# Patient Record
Sex: FEMALE | Race: WHITE | NOT HISPANIC OR LATINO | Employment: UNEMPLOYED | URBAN - METROPOLITAN AREA
[De-identification: names, ages, dates, MRNs, and addresses within clinical notes are randomized per-mention and may not be internally consistent; named-entity substitution may affect disease eponyms.]

---

## 2016-03-11 LAB — CRC RECOMMENDATION EXT: NORMAL

## 2020-12-30 LAB
BCS RECOMMENDATION EXT: NORMAL
BMD RECOMMENDATION EXT: NORMAL

## 2023-07-10 ENCOUNTER — HOSPITAL ENCOUNTER (EMERGENCY)
Facility: HOSPITAL | Age: 58
Discharge: HOME | End: 2023-07-10
Attending: EMERGENCY MEDICINE
Payer: MEDICARE

## 2023-07-10 VITALS
DIASTOLIC BLOOD PRESSURE: 76 MMHG | HEIGHT: 63 IN | SYSTOLIC BLOOD PRESSURE: 113 MMHG | TEMPERATURE: 98 F | OXYGEN SATURATION: 99 % | HEART RATE: 69 BPM | RESPIRATION RATE: 14 BRPM | BODY MASS INDEX: 31.54 KG/M2 | WEIGHT: 178 LBS

## 2023-07-10 DIAGNOSIS — I48.91 ATRIAL FIBRILLATION WITH RAPID VENTRICULAR RESPONSE: Primary | ICD-10-CM

## 2023-07-10 DIAGNOSIS — R06.02 SHORTNESS OF BREATH: ICD-10-CM

## 2023-07-10 PROBLEM — I48.19 PERSISTENT ATRIAL FIBRILLATION: Status: ACTIVE | Noted: 2023-07-10

## 2023-07-10 LAB
ALBUMIN SERPL BCP-MCNC: 3.9 G/DL (ref 3.5–5.2)
ALP SERPL-CCNC: 103 U/L (ref 55–135)
ALT SERPL W/O P-5'-P-CCNC: 42 U/L (ref 10–44)
ANION GAP SERPL CALC-SCNC: 9 MMOL/L (ref 8–16)
ASCENDING AORTA: 2.93 CM
AST SERPL-CCNC: 31 U/L (ref 10–40)
AV INDEX (PROSTH): 0.77
AV MEAN GRADIENT: 3 MMHG
AV PEAK GRADIENT: 5 MMHG
AV VALVE AREA: 2.41 CM2
AV VELOCITY RATIO: 0.76
BASOPHILS # BLD AUTO: 0.05 K/UL (ref 0–0.2)
BASOPHILS NFR BLD: 1.1 % (ref 0–1.9)
BILIRUB SERPL-MCNC: 0.8 MG/DL (ref 0.1–1)
BNP SERPL-MCNC: 50 PG/ML (ref 0–99)
BSA FOR ECHO PROCEDURE: 1.89 M2
BUN SERPL-MCNC: 16 MG/DL (ref 6–20)
CALCIUM SERPL-MCNC: 8.8 MG/DL (ref 8.7–10.5)
CHLORIDE SERPL-SCNC: 109 MMOL/L (ref 95–110)
CO2 SERPL-SCNC: 22 MMOL/L (ref 23–29)
CREAT SERPL-MCNC: 0.7 MG/DL (ref 0.5–1.4)
CTP QC/QA: YES
CV ECHO LV RWT: 0.34 CM
DIFFERENTIAL METHOD: ABNORMAL
DOP CALC AO PEAK VEL: 1.14 M/S
DOP CALC AO VTI: 20.4 CM
DOP CALC LVOT AREA: 3.1 CM2
DOP CALC LVOT DIAMETER: 1.99 CM
DOP CALC LVOT PEAK VEL: 0.87 M/S
DOP CALC LVOT STROKE VOLUME: 49.12 CM3
DOP CALC MV VTI: 8.9 CM
DOP CALCLVOT PEAK VEL VTI: 15.8 CM
E WAVE DECELERATION TIME: 88.35 MSEC
E/E' RATIO: 5.43 M/S
ECHO LV POSTERIOR WALL: 0.73 CM (ref 0.6–1.1)
EJECTION FRACTION: 60 %
EOSINOPHIL # BLD AUTO: 0.2 K/UL (ref 0–0.5)
EOSINOPHIL NFR BLD: 3.2 % (ref 0–8)
ERYTHROCYTE [DISTWIDTH] IN BLOOD BY AUTOMATED COUNT: 12.1 % (ref 11.5–14.5)
EST. GFR  (NO RACE VARIABLE): >60 ML/MIN/1.73 M^2
FRACTIONAL SHORTENING: 31 % (ref 28–44)
GLUCOSE SERPL-MCNC: 99 MG/DL (ref 70–110)
HCT VFR BLD AUTO: 37.8 % (ref 37–48.5)
HGB BLD-MCNC: 12.6 G/DL (ref 12–16)
IMM GRANULOCYTES # BLD AUTO: 0 K/UL (ref 0–0.04)
IMM GRANULOCYTES NFR BLD AUTO: 0 % (ref 0–0.5)
INTERVENTRICULAR SEPTUM: 0.69 CM (ref 0.6–1.1)
IVC DIAMETER: 0.68 CM
LA MAJOR: 5.5 CM
LA MINOR: 3.46 CM
LA WIDTH: 3.5 CM
LEFT ATRIUM SIZE: 3.32 CM
LEFT ATRIUM VOLUME INDEX: 22.8 ML/M2
LEFT ATRIUM VOLUME: 41.96 CM3
LEFT INTERNAL DIMENSION IN SYSTOLE: 2.93 CM (ref 2.1–4)
LEFT VENTRICLE DIASTOLIC VOLUME INDEX: 43.82 ML/M2
LEFT VENTRICLE DIASTOLIC VOLUME: 80.62 ML
LEFT VENTRICLE MASS INDEX: 48 G/M2
LEFT VENTRICLE SYSTOLIC VOLUME INDEX: 17.9 ML/M2
LEFT VENTRICLE SYSTOLIC VOLUME: 32.94 ML
LEFT VENTRICULAR INTERNAL DIMENSION IN DIASTOLE: 4.25 CM (ref 3.5–6)
LEFT VENTRICULAR MASS: 88.39 G
LV LATERAL E/E' RATIO: 5 M/S
LV SEPTAL E/E' RATIO: 5.94 M/S
LVOT MG: 2.13 MMHG
LVOT MV: 0.72 CM/S
LYMPHOCYTES # BLD AUTO: 2.4 K/UL (ref 1–4.8)
LYMPHOCYTES NFR BLD: 50.5 % (ref 18–48)
MAGNESIUM SERPL-MCNC: 2.1 MG/DL (ref 1.6–2.6)
MCH RBC QN AUTO: 30.2 PG (ref 27–31)
MCHC RBC AUTO-ENTMCNC: 33.3 G/DL (ref 32–36)
MCV RBC AUTO: 91 FL (ref 82–98)
MONOCYTES # BLD AUTO: 0.4 K/UL (ref 0.3–1)
MONOCYTES NFR BLD: 9.2 % (ref 4–15)
MV MEAN GRADIENT: 1 MMHG
MV PEAK E VEL: 0.95 M/S
MV PEAK GRADIENT: 3 MMHG
MV STENOSIS PRESSURE HALF TIME: 36.81 MS
MV VALVE AREA BY CONTINUITY EQUATION: 5.52 CM2
MV VALVE AREA P 1/2 METHOD: 5.98 CM2
NEUTROPHILS # BLD AUTO: 1.7 K/UL (ref 1.8–7.7)
NEUTROPHILS NFR BLD: 36 % (ref 38–73)
NRBC BLD-RTO: 0 /100 WBC
PISA TR MAX VEL: 1.71 M/S
PLATELET # BLD AUTO: 170 K/UL (ref 150–450)
PMV BLD AUTO: 10.8 FL (ref 9.2–12.9)
POTASSIUM SERPL-SCNC: 4.1 MMOL/L (ref 3.5–5.1)
PROT SERPL-MCNC: 7.1 G/DL (ref 6–8.4)
PV PEAK VELOCITY: 0.65 CM/S
RA MAJOR: 4.65 CM
RA PRESSURE: 3 MMHG
RA WIDTH: 2.6 CM
RBC # BLD AUTO: 4.17 M/UL (ref 4–5.4)
RIGHT VENTRICULAR END-DIASTOLIC DIMENSION: 2.09 CM
SARS-COV-2 RDRP RESP QL NAA+PROBE: NEGATIVE
SINUS: 2.54 CM
SODIUM SERPL-SCNC: 140 MMOL/L (ref 136–145)
STJ: 2.2 CM
TDI LATERAL: 0.19 M/S
TDI SEPTAL: 0.16 M/S
TDI: 0.18 M/S
TR MAX PG: 12 MMHG
TRICUSPID ANNULAR PLANE SYSTOLIC EXCURSION: 2.19 CM
TROPONIN I SERPL DL<=0.01 NG/ML-MCNC: 0.01 NG/ML (ref 0–0.03)
TSH SERPL DL<=0.005 MIU/L-ACNC: 3.31 UIU/ML (ref 0.4–4)
TV REST PULMONARY ARTERY PRESSURE: 15 MMHG
WBC # BLD AUTO: 4.65 K/UL (ref 3.9–12.7)

## 2023-07-10 PROCEDURE — 96374 THER/PROPH/DIAG INJ IV PUSH: CPT

## 2023-07-10 PROCEDURE — 99291 CRITICAL CARE FIRST HOUR: CPT

## 2023-07-10 PROCEDURE — 85025 COMPLETE CBC W/AUTO DIFF WBC: CPT | Performed by: EMERGENCY MEDICINE

## 2023-07-10 PROCEDURE — 93010 EKG 12-LEAD: ICD-10-PCS | Mod: ,,, | Performed by: INTERNAL MEDICINE

## 2023-07-10 PROCEDURE — 63600175 PHARM REV CODE 636 W HCPCS: Performed by: EMERGENCY MEDICINE

## 2023-07-10 PROCEDURE — 87635 SARS-COV-2 COVID-19 AMP PRB: CPT | Performed by: EMERGENCY MEDICINE

## 2023-07-10 PROCEDURE — 25000003 PHARM REV CODE 250: Performed by: INTERNAL MEDICINE

## 2023-07-10 PROCEDURE — 99284 EMERGENCY DEPT VISIT MOD MDM: CPT | Mod: 25,,, | Performed by: INTERNAL MEDICINE

## 2023-07-10 PROCEDURE — 96372 THER/PROPH/DIAG INJ SC/IM: CPT | Mod: 59 | Performed by: EMERGENCY MEDICINE

## 2023-07-10 PROCEDURE — 99152 MOD SED SAME PHYS/QHP 5/>YRS: CPT

## 2023-07-10 PROCEDURE — 84484 ASSAY OF TROPONIN QUANT: CPT | Performed by: EMERGENCY MEDICINE

## 2023-07-10 PROCEDURE — 96375 TX/PRO/DX INJ NEW DRUG ADDON: CPT

## 2023-07-10 PROCEDURE — 92960 CARDIOVERSION ELECTRIC EXT: CPT | Mod: ,,, | Performed by: INTERNAL MEDICINE

## 2023-07-10 PROCEDURE — 83880 ASSAY OF NATRIURETIC PEPTIDE: CPT | Performed by: EMERGENCY MEDICINE

## 2023-07-10 PROCEDURE — 99284 PR EMERGENCY DEPT VISIT,LEVEL IV: ICD-10-PCS | Mod: 25,,, | Performed by: INTERNAL MEDICINE

## 2023-07-10 PROCEDURE — 92960 CARDIOVERSION ELECTRIC EXT: CPT | Performed by: INTERNAL MEDICINE

## 2023-07-10 PROCEDURE — 92960 PR CARDIOVERSION, ELECTIVE;EXTERN: ICD-10-PCS | Mod: ,,, | Performed by: INTERNAL MEDICINE

## 2023-07-10 PROCEDURE — 25000003 PHARM REV CODE 250: Performed by: EMERGENCY MEDICINE

## 2023-07-10 PROCEDURE — 83735 ASSAY OF MAGNESIUM: CPT | Performed by: EMERGENCY MEDICINE

## 2023-07-10 PROCEDURE — 80053 COMPREHEN METABOLIC PANEL: CPT | Performed by: EMERGENCY MEDICINE

## 2023-07-10 PROCEDURE — 93010 ELECTROCARDIOGRAM REPORT: CPT | Mod: ,,, | Performed by: INTERNAL MEDICINE

## 2023-07-10 PROCEDURE — 84443 ASSAY THYROID STIM HORMONE: CPT | Performed by: EMERGENCY MEDICINE

## 2023-07-10 PROCEDURE — 93005 ELECTROCARDIOGRAM TRACING: CPT | Mod: 59

## 2023-07-10 RX ORDER — PROPOFOL 10 MG/ML
80 VIAL (ML) INTRAVENOUS ONCE
Status: COMPLETED | OUTPATIENT
Start: 2023-07-10 | End: 2023-07-10

## 2023-07-10 RX ORDER — ONDANSETRON 2 MG/ML
4 INJECTION INTRAMUSCULAR; INTRAVENOUS
Status: COMPLETED | OUTPATIENT
Start: 2023-07-10 | End: 2023-07-10

## 2023-07-10 RX ORDER — PROPOFOL 10 MG/ML
VIAL (ML) INTRAVENOUS
Status: DISCONTINUED | OUTPATIENT
Start: 2023-07-10 | End: 2023-07-10 | Stop reason: HOSPADM

## 2023-07-10 RX ORDER — DILTIAZEM HYDROCHLORIDE 5 MG/ML
20 INJECTION INTRAVENOUS
Status: DISCONTINUED | OUTPATIENT
Start: 2023-07-10 | End: 2023-07-10 | Stop reason: HOSPADM

## 2023-07-10 RX ORDER — ENOXAPARIN SODIUM 100 MG/ML
1 INJECTION SUBCUTANEOUS
Status: COMPLETED | OUTPATIENT
Start: 2023-07-10 | End: 2023-07-10

## 2023-07-10 RX ORDER — METOPROLOL SUCCINATE 50 MG/1
50 TABLET, EXTENDED RELEASE ORAL DAILY
Status: DISCONTINUED | OUTPATIENT
Start: 2023-07-10 | End: 2023-07-10 | Stop reason: HOSPADM

## 2023-07-10 RX ORDER — METOPROLOL SUCCINATE 50 MG/1
50 TABLET, EXTENDED RELEASE ORAL DAILY
Qty: 90 TABLET | Refills: 3 | Status: SHIPPED | OUTPATIENT
Start: 2023-07-11

## 2023-07-10 RX ORDER — DILTIAZEM HYDROCHLORIDE 5 MG/ML
0.25 INJECTION INTRAVENOUS
Status: COMPLETED | OUTPATIENT
Start: 2023-07-10 | End: 2023-07-10

## 2023-07-10 RX ADMIN — ENOXAPARIN SODIUM 80 MG: 80 INJECTION SUBCUTANEOUS at 07:07

## 2023-07-10 RX ADMIN — METOPROLOL SUCCINATE 50 MG: 50 TABLET, EXTENDED RELEASE ORAL at 08:07

## 2023-07-10 RX ADMIN — PROPOFOL 80 MG: 10 INJECTION, EMULSION INTRAVENOUS at 09:07

## 2023-07-10 RX ADMIN — ONDANSETRON 4 MG: 2 INJECTION INTRAMUSCULAR; INTRAVENOUS at 09:07

## 2023-07-10 RX ADMIN — DILTIAZEM HYDROCHLORIDE 20 MG: 5 INJECTION INTRAVENOUS at 06:07

## 2023-07-10 NOTE — ED NOTES
Bed: 04main  Expected date:   Expected time:   Means of arrival:   Comments:  BEDSIDE CARDIOVERSION

## 2023-07-10 NOTE — PROCEDURES
"Shamika Padilla is a 57 y.o. female patient.    Temp: 97.8 °F (36.6 °C) (07/10/23 0539)  Pulse: (!) 141 (07/10/23 0844)  Resp: (!) 21 (07/10/23 0842)  BP: 121/77 (07/10/23 0844)  SpO2: 97 % (07/10/23 0842)  Weight: 80.7 kg (178 lb) (07/10/23 0539)  Height: 5' 3" (160 cm) (07/10/23 0539)       Procedures    CV   Dr Bowen  Pre-op Dx A-fib  Post-op Dx same  Specimen none  EBL < 50 cc    7/10/23 CV - Sedation by ER physician. 200J shock converted A-fib to NSR 80s    Ok for d/c  Xarelto for 1 month then ASA 81 qd  Tropol XL 50 qd  OV with me 1 week with EKG    7/10/2023    "

## 2023-07-10 NOTE — HPI
58 yo F presenting with acute onset of heart racing palpitations and neck tightness 40 min PTA. States this woke her up from her sleep. Also reports associated chest discomfort and tightness on ambulation. No other exacerbating or alleviating factors. Denies recent dyspnea, PND, SANFORD, abdominal pain, nausea, chest pain, other associated symptoms. Nonsmoker. No alcohol or illicit drug use. No personal h/o PE, DVT, surgeries in the last 4 mo or other recent immobilizations. No PMHx, but has not f/u w/ a PCP in years. No personal h/o Afib.     Initially rates improved with IV diltiazem - going back up now  Denies CP or SOB  On no medications  EKG A-fib 164 NSSTT changes  BNP 50  Troponin negative

## 2023-07-10 NOTE — ED NOTES
Received report from YUNG Kenyon. Pt MOSES maldonado. Pt currently still in Afib (MD AWARE- cardiology consulted.) Ordered medications administered. Awaiting echo. Pt NPO for time being. Denies any needs at this time.

## 2023-07-10 NOTE — ASSESSMENT & PLAN NOTE
New Dx. Woke her from sleep. Rates improved with IV diltiazem but going back up. Check echo and TSH. Full dose lovenox. PO BB. Discussed CV in ER and patient is in agreement. No Need for MECHE with < 48 duration. CHADS-vasc 1 - xarelto for 1 month at discharge then ASA

## 2023-07-10 NOTE — ED NOTES
Pt presents to ED c/o chest tightness that radiates to her neck. Pt states pain woke her up. Afib with RVR noted on monitor. Pt denies medical hx. Denies SOB, weakness or dizziness. Pt placed on telemetry. Ed work up in progress.

## 2023-07-10 NOTE — ED PROVIDER NOTES
Encounter Date: 7/10/2023    SCRIBE #1 NOTE: I, Catherine Chan, am scribing for, and in the presence of,  Philipp Bell MD.     History     Chief Complaint   Patient presents with    Palpitations    Chest Pain     Arrives to ER with complaints of CP that woke her up out of her sleep, pain started in her neck and radiated down to the middle of her chest. Denies any heart hx.      58 yo F presenting with acute onset of heart racing palpitations and neck tightness 40 min PTA. States this woke her up from her sleep. Also reports associated chest discomfort and tightness on ambulation. No other exacerbating or alleviating factors. Denies recent dyspnea, PND, SANFORD, abdominal pain, nausea, chest pain, other associated symptoms. Nonsmoker. No alcohol or illicit drug use. No personal h/o PE, DVT, surgeries in the last 4 mo or other recent immobilizations. No PMHx, but has not f/u w/ a PCP in years. No personal h/o Afib.     The history is provided by the patient.   Review of patient's allergies indicates:  No Known Allergies  No past medical history on file.  No past surgical history on file.  No family history on file.     Review of Systems   Constitutional:  Negative for chills and fever.   HENT:  Negative for congestion, rhinorrhea and sore throat.    Eyes:  Negative for visual disturbance.   Respiratory:  Positive for chest tightness. Negative for cough and shortness of breath.    Cardiovascular:  Positive for palpitations. Negative for chest pain and leg swelling.   Gastrointestinal:  Negative for abdominal pain, diarrhea, nausea and vomiting.   Genitourinary:  Negative for dysuria, frequency and hematuria.   Musculoskeletal:  Negative for back pain.        +neck tightness   Skin:  Negative for rash.   Neurological:  Negative for dizziness, weakness and headaches.     Physical Exam     Initial Vitals [07/10/23 0539]   BP Pulse Resp Temp SpO2   (!) 123/92 (!) 116 20 97.8 °F (36.6 °C) 98 %      MAP       --      "    Physical Exam    Nursing note and vitals reviewed.  Constitutional: She appears well-developed and well-nourished. She does not appear ill. No distress.   HENT:   Head: Normocephalic and atraumatic.   Mouth/Throat: Oropharynx is clear and moist.   Eyes: Conjunctivae and EOM are normal.   Neck:   Normal range of motion.  Cardiovascular:  Normal heart sounds.           No murmur heard.  Irregular. Rapid HR   Pulmonary/Chest: Breath sounds normal. No respiratory distress. She has no wheezes.   Abdominal: Abdomen is soft. There is no abdominal tenderness.   Musculoskeletal:         General: No edema.      Cervical back: Normal range of motion.      Comments: No BLE edema or tenderness.      Neurological: She is alert. GCS score is 15.   Skin: Skin is warm.   Psychiatric: She has a normal mood and affect.       ED Course   Procedural Sedation        Date/Time: 7/10/2023 6:00 AM  Performed by: Kenyon Dolan MD  Authorized by: Kenyon Dolan MD   Consent Done: Yes  Consent: Verbal consent obtained. Written consent obtained.  Risks and benefits: risks, benefits and alternatives were discussed  Consent given by: patient  Patient understanding: patient states understanding of the procedure being performed  Patient consent: the patient's understanding of the procedure matches consent given  Procedure consent: procedure consent matches procedure scheduled  Relevant documents: relevant documents present and verified  Test results: test results available and properly labeled  Imaging studies: imaging studies available  Required items: required blood products, implants, devices, and special equipment available  Time out: Immediately prior to procedure a "time out" was called to verify the correct patient, procedure, equipment, support staff and site/side marked as required.  ASA Class: Class 1 - Heathy patient. No medical history.  Mallampati Score: Class 1 - Visualization of the soft palate, fauces, uvula, and " anterior/posterior pillars.   NPO STATUS:  Date/Time of last solid: 7/9/2023 8:00 PM  Contents of last solid: no food, water only  Date/Time of last fluid: 7/10/2023 5:00 AM  Contents of last fluid: water    Equipment: on cardiac monitor., on BP monitor., on CO2 monitor., airway equipment available., suction available., on supplemental oxygen. and reversal drugs available.     Sedation type: deep sedation    Sedatives: propofol  Sedation start date/time: 7/10/2023 9:02 AM  Sedation end date/time: 7/10/2023 9:07 AM  Total Sedation Time (min): 5  Vitals: Vital signs were monitored during sedation.  Complications: No complications.   Patient/Family history of anesthesia or sedation complications: No    Critical Care    Date/Time: 7/10/2023 6:00 AM  Performed by: Kenyon Dolan MD  Authorized by: Kenyon Dolan MD   Direct patient critical care time: 15 minutes  Additional history critical care time: 5 minutes  Ordering / reviewing critical care time: 5 minutes  Documentation critical care time: 5 minutes  Consulting other physicians critical care time: 5 minutes  Total critical care time (exclusive of procedural time) : 35 minutes  Critical care time was exclusive of separately billable procedures and treating other patients and teaching time.  Critical care was necessary to treat or prevent imminent or life-threatening deterioration of the following conditions: cardiac failure, circulatory failure and shock.  Critical care was time spent personally by me on the following activities: development of treatment plan with patient or surrogate, discussions with consultants, evaluation of patient's response to treatment, examination of patient, obtaining history from patient or surrogate, ordering and performing treatments and interventions, ordering and review of laboratory studies, ordering and review of radiographic studies, re-evaluation of patient's condition and review of old charts.      Labs Reviewed  "  CBC W/ AUTO DIFFERENTIAL - Abnormal; Notable for the following components:       Result Value    Gran # (ANC) 1.7 (*)     Gran % 36.0 (*)     Lymph % 50.5 (*)     All other components within normal limits   COMPREHENSIVE METABOLIC PANEL - Abnormal; Notable for the following components:    CO2 22 (*)     All other components within normal limits   SARS-COV-2 RDRP GENE - Normal   B-TYPE NATRIURETIC PEPTIDE   TROPONIN I   MAGNESIUM   MAGNESIUM   TSH        ECG Results              EKG 12-lead (Final result)  Result time 07/10/23 19:51:31      Final result by Interface, Lab In [facility] (07/10/23 19:51:31)                   Narrative:    Test Reason : R06.02,    Vent. Rate : 102 BPM     Atrial Rate : 133 BPM     P-R Int : 000 ms          QRS Dur : 074 ms      QT Int : 350 ms       P-R-T Axes : 000 056 010 degrees     QTc Int : 456 ms    Atrial fibrillation with rapid ventricular response  Abnormal ECG  When compared with ECG of 10-JUL-2023 05:36,  Significant changes have occurred  Confirmed by Jose Luis Bowen MD ([id number]) on 7/10/2023 7:51:23 PM    Referred By: [email or web address]   SELF           Confirmed By:Jose Luis Bowen MD                                  Imaging Results              X-Ray Chest 1 View (Final result)  Result time 07/10/23 06:55:28      Final result by Travon Joseph MD (07/10/23 06:55:28)                   Impression:      Subtle increased ground-glass attenuation in the left lung base which could be exaggerated by breast attenuation.  Underlying infectious/inflammatory process felt less likely in the absence of clinical symptoms.    Otherwise, no acute abnormality identified.      Electronically signed by: Travon Joseph MD  Date:    07/10/2023  Time:    06:55               Narrative:    EXAMINATION:  XR CHEST 1 VIEW    CLINICAL HISTORY:  Provided history is "shortness of breath;  ".    TECHNIQUE:  One view of the chest.    COMPARISON:  None.    FINDINGS:  Cardiac wires overlie the chest.  Cardiomediastinal " silhouette is magnified by portable technique and may be at the upper limits of normal in size.  Subtle increased ground-glass attenuation in the left lung base which could be exaggerated by breast attenuation.  No confluent area of consolidation.  No large pleural effusion.  No pneumothorax.                                       Medications   diltiaZEM injection 20 mg (20 mg Intravenous Given 7/10/23 0613)   enoxaparin injection 80 mg (80 mg Subcutaneous Given 7/10/23 0714)   propofol (DIPRIVAN) 10 mg/mL IVP (80 mg Intravenous Given by Other 7/10/23 0902)   ondansetron injection 4 mg (4 mg Intravenous Given 7/10/23 0901)     Medical Decision Making:   History:   Old Medical Records: I decided to obtain old medical records.        Scribe Attestation:   Scribe #1: I performed the above scribed service and the documentation accurately describes the services I performed. I attest to the accuracy of the note.      ED Course as of 07/10/23 2240   Mon Jul 10, 2023   0543 EKG 12-lead  Time 5:36 a.m.     Rate roughly 160, not sinus, irregular rhythm, normal axis.  QRS 82 .  No ST elevation or depression.  T-wave inversion lead 3.  No hyperacute T-waves.  Infrequent PVCs.      Atrial fibrillation with rapid ventricular rate, infrequent PVCs.   [JM]      ED Course User Index  [JM] Philipp Bell MD         Assumed care patient after initial evaluation, pending additional lab work.  Additional lab work appears unremarkable, EKG shows AFib with RVR.  Initial diltiazem bolus significantly improving symptoms, blood pressure remained stable.  Cardiology evaluated patient at bedside, recommending Lovenox, metoprolol, cardioversion.  Patient consented for sedation as noted above, cardiology bedside to assist with procedural sedation for cardioversion of atrial fibrillation.  Patient successfully cardioverted to normal sinus rhythm.  She will be discharged on oral metoprolol and Xarelto and have follow-up in clinic.   Echocardiogram appears unremarkable.  Patient was discharged to home improved and stable.  All questions were answered and patient in understanding of follow-up plan.            I, Kenyon Dolan M.D., personally performed the services described in this documentation. All medical record entries made by the scribe were at my direction and in my presence. I have reviewed the chart and agree that the record reflects my personal performance and is accurate and complete.    Clinical Impression:   Final diagnoses:  [R06.02] Shortness of breath  [I48.91] Atrial fibrillation with rapid ventricular response (Primary)        ED Disposition Condition    Discharge Stable          ED Prescriptions       Medication Sig Dispense Start Date End Date Auth. Provider    rivaroxaban (XARELTO) 20 mg Tab Take 1 tablet (20 mg total) by mouth daily with dinner or evening meal. 30 tablet 7/10/2023 -- Jose Luis Bowen MD    metoprolol succinate (TOPROL-XL) 50 MG 24 hr tablet Take 1 tablet (50 mg total) by mouth once daily. 90 tablet 7/11/2023 -- Jose Luis Bowen MD          Follow-up Information       Follow up With Specialties Details Why Contact Info    [facility] Emergency Dept Emergency Medicine Go to  If symptoms worsen [address]  [facility] [id number] [phone]    Jose Luis Bowen MD Cardiology Schedule an appointment as soon as possible for a visit   [address]  [address]  [facility] [address]  [phone]               Kenyon Dolan MD  07/10/23 6595

## 2023-07-10 NOTE — CONSULTS
[facility] Emergency Dept  Cardiology  Consult Note    Patient Name: Shamika Padilla  MRN: [id number]  Admission Date: 7/10/2023  Hospital Length of Stay: 0 days  Code Status: Full Code   Attending Provider: Kenyon Dolan MD   Consulting Provider: Jose Luis Bowen MD  Primary Care Physician: No primary care provider on file.  Principal Problem:<principal problem not specified>    Patient information was obtained from patient and ER records.     Inpatient consult to Cardiology  Consult performed by: Jose Luis Bowen MD  Consult ordered by: Kenyon Dolan MD        Subjective:     Chief Complaint:  A-fib     HPI:   58 yo F presenting with acute onset of heart racing palpitations and neck tightness 40 min PTA. States this woke her up from her sleep. Also reports associated chest discomfort and tightness on ambulation. No other exacerbating or alleviating factors. Denies recent dyspnea, PND, SANFORD, abdominal pain, nausea, chest pain, other associated symptoms. Nonsmoker. No alcohol or illicit drug use. No personal h/o PE, DVT, surgeries in the last 4 mo or other recent immobilizations. No PMHx, but has not f/u w/ a PCP in years. No personal h/o Afib.     Initially rates improved with IV diltiazem - going back up now  Denies CP or SOB  On no medications  EKG A-fib 164 NSSTT changes  BNP 50  Troponin negative      No past medical history on file.    No past surgical history on file.    Review of patient's allergies indicates:  No Known Allergies    No current facility-administered medications on file prior to encounter.     No current outpatient medications on file prior to encounter.     Family History    None       Tobacco Use    Smoking status: Not on file    Smokeless tobacco: Not on file   Substance and Sexual Activity    Alcohol use: Not on file    Drug use: Not on file    Sexual activity: Not on file     Review of Systems   Constitutional: Negative for decreased appetite.   HENT:  Negative for ear  discharge.    Eyes:  Negative for blurred vision.   Respiratory:  Negative for hemoptysis.    Endocrine: Negative for polyphagia.   Hematologic/Lymphatic: Negative for adenopathy.   Skin:  Negative for color change.   Musculoskeletal:  Negative for joint swelling.   Genitourinary:  Negative for bladder incontinence.   Neurological:  Negative for brief paralysis.   Psychiatric/Behavioral:  Negative for hallucinations.    Allergic/Immunologic: Negative for hives.   Objective:     Vital Signs (Most Recent):  Temp: 97.8 °F (36.6 °C) (07/10/23 0539)  Pulse: 105 (07/10/23 0842)  Resp: (!) 21 (07/10/23 0842)  BP: 119/82 (07/10/23 0842)  SpO2: 97 % (07/10/23 0842) Vital Signs (24h Range):  Temp:  [97.8 °F (36.6 °C)] 97.8 °F (36.6 °C)  Pulse:  [] 105  Resp:  [10-21] 21  SpO2:  [95 %-98 %] 97 %  BP: (110-133)/(56-92) 119/82     Weight: 80.7 kg (178 lb)  Body mass index is 31.53 kg/m².    SpO2: 97 %       No intake or output data in the 24 hours ending 07/10/23 0845    Lines/Drains/Airways       Peripheral Intravenous Line  Duration                  Peripheral IV - Single Lumen 07/10/23 0607 20 G;1 in Anterior;Proximal;Right Forearm <1 day                     Physical Exam  Constitutional:       Appearance: She is well-developed.   HENT:      Head: Normocephalic and atraumatic.   Eyes:      Conjunctiva/sclera: Conjunctivae normal.      Pupils: Pupils are equal, round, and reactive to light.   Cardiovascular:      Rate and Rhythm: Tachycardia present. Rhythm irregular.      Pulses: Intact distal pulses.      Heart sounds: Normal heart sounds.   Pulmonary:      Effort: Pulmonary effort is normal.      Breath sounds: Normal breath sounds.   Abdominal:      General: Bowel sounds are normal.      Palpations: Abdomen is soft.   Musculoskeletal:         General: Normal range of motion.      Cervical back: Normal range of motion and neck supple.   Skin:     General: Skin is warm and dry.   Neurological:      Mental Status: She  is alert and oriented to person, place, and time.        Significant Labs: All pertinent lab results from the last 24 hours have been reviewed.    Significant Imaging: Echocardiogram: 2D echo with color flow doppler: No results found for this or any previous visit.    Assessment and Plan:     Persistent atrial fibrillation  New Dx. Woke her from sleep. Rates improved with IV diltiazem but going back up. Check echo and TSH. Full dose lovenox. PO BB. Discussed CV in ER and patient is in agreement. No Need for MECHE with < 48 duration. CHADS-vasc 1 - xarelto for 1 month at discharge then ASA        VTE Risk Mitigation (From admission, onward)    None          Thank you for your consult. I will follow-up with patient. Please contact us if you have any additional questions.    Jose Luis Bowen MD  Cardiology   [facility] [place] [place] - Emergency Dept

## 2023-07-14 ENCOUNTER — OFFICE VISIT (OUTPATIENT)
Dept: CARDIOLOGY | Facility: CLINIC | Age: 58
End: 2023-07-14
Payer: MEDICARE

## 2023-07-14 VITALS
RESPIRATION RATE: 15 BRPM | OXYGEN SATURATION: 98 % | HEIGHT: 63 IN | BODY MASS INDEX: 32.79 KG/M2 | HEART RATE: 84 BPM | DIASTOLIC BLOOD PRESSURE: 86 MMHG | SYSTOLIC BLOOD PRESSURE: 114 MMHG | WEIGHT: 185.06 LBS

## 2023-07-14 DIAGNOSIS — I48.19 PERSISTENT ATRIAL FIBRILLATION: Primary | ICD-10-CM

## 2023-07-14 DIAGNOSIS — I48.91 ATRIAL FIBRILLATION WITH RAPID VENTRICULAR RESPONSE: ICD-10-CM

## 2023-07-14 PROCEDURE — 3074F PR MOST RECENT SYSTOLIC BLOOD PRESSURE < 130 MM HG: ICD-10-PCS | Mod: CPTII,S$GLB,, | Performed by: INTERNAL MEDICINE

## 2023-07-14 PROCEDURE — 99999 PR PBB SHADOW E&M-EST. PATIENT-LVL III: ICD-10-PCS | Mod: PBBFAC,,, | Performed by: INTERNAL MEDICINE

## 2023-07-14 PROCEDURE — 1159F MED LIST DOCD IN RCRD: CPT | Mod: CPTII,S$GLB,, | Performed by: INTERNAL MEDICINE

## 2023-07-14 PROCEDURE — 99999 PR PBB SHADOW E&M-EST. PATIENT-LVL III: CPT | Mod: PBBFAC,,, | Performed by: INTERNAL MEDICINE

## 2023-07-14 PROCEDURE — 3074F SYST BP LT 130 MM HG: CPT | Mod: CPTII,S$GLB,, | Performed by: INTERNAL MEDICINE

## 2023-07-14 PROCEDURE — 3079F DIAST BP 80-89 MM HG: CPT | Mod: CPTII,S$GLB,, | Performed by: INTERNAL MEDICINE

## 2023-07-14 PROCEDURE — 3008F PR BODY MASS INDEX (BMI) DOCUMENTED: ICD-10-PCS | Mod: CPTII,S$GLB,, | Performed by: INTERNAL MEDICINE

## 2023-07-14 PROCEDURE — 1159F PR MEDICATION LIST DOCUMENTED IN MEDICAL RECORD: ICD-10-PCS | Mod: CPTII,S$GLB,, | Performed by: INTERNAL MEDICINE

## 2023-07-14 PROCEDURE — 3079F PR MOST RECENT DIASTOLIC BLOOD PRESSURE 80-89 MM HG: ICD-10-PCS | Mod: CPTII,S$GLB,, | Performed by: INTERNAL MEDICINE

## 2023-07-14 PROCEDURE — 93000 ELECTROCARDIOGRAM COMPLETE: CPT | Mod: S$GLB,,, | Performed by: INTERNAL MEDICINE

## 2023-07-14 PROCEDURE — 99214 PR OFFICE/OUTPT VISIT, EST, LEVL IV, 30-39 MIN: ICD-10-PCS | Mod: S$GLB,,, | Performed by: INTERNAL MEDICINE

## 2023-07-14 PROCEDURE — 3008F BODY MASS INDEX DOCD: CPT | Mod: CPTII,S$GLB,, | Performed by: INTERNAL MEDICINE

## 2023-07-14 PROCEDURE — 99214 OFFICE O/P EST MOD 30 MIN: CPT | Mod: S$GLB,,, | Performed by: INTERNAL MEDICINE

## 2023-07-14 PROCEDURE — 93000 EKG 12-LEAD: ICD-10-PCS | Mod: S$GLB,,, | Performed by: INTERNAL MEDICINE

## 2023-07-14 RX ORDER — ASPIRIN 81 MG/1
81 TABLET ORAL DAILY
Refills: 0
Start: 2023-07-14 | End: 2024-07-13

## 2023-07-14 NOTE — PROGRESS NOTES
Subjective:   Patient ID:  Shamika Padilla is a 57 y.o. female who presents for follow-up of No chief complaint on file.      HPI    A-fib 7/10/23 - CV        7/10/23 CV - Sedation by ER physician. 200J shock converted A-fib to NSR 80s    Echo 7/10/23  The left ventricle is normal in size with normal systolic function.  The estimated ejection fraction is 60%.  Normal left ventricular diastolic function.  Normal right ventricular size with normal right ventricular systolic function.  Mild left atrial enlargement.  Normal central venous pressure (3 mmHg).  The estimated PA systolic pressure is 15 mmHg.    Went to the ER 7/10/23  56 yo F presenting with acute onset of heart racing palpitations and neck tightness 40 min PTA. States this woke her up from her sleep. Also reports associated chest discomfort and tightness on ambulation. No other exacerbating or alleviating factors. Denies recent dyspnea, PND, SANFORD, abdominal pain, nausea, chest pain, other associated symptoms. Nonsmoker. No alcohol or illicit drug use. No personal h/o PE, DVT, surgeries in the last 4 mo or other recent immobilizations. No PMHx, but has not f/u w/ a PCP in years. No personal h/o Afib.      Initially rates improved with IV diltiazem - going back up now  Denies CP or SOB  On no medications  EKG A-fib 164 NSSTT changes  BNP 50  Troponin negative   Persistent atrial fibrillation  New Dx. Woke her from sleep. Rates improved with IV diltiazem but going back up. Check echo and TSH. Full dose lovenox. PO BB. Discussed CV in ER and patient is in agreement. No Need for MECHE with < 48 duration. CHADS-vasc 1 - xarelto for 1 month at discharge then ASA     7/14/23 Denies heart racing or palpitations since discharge  Tolerating toprol  EKg NSR - ok    Review of Systems   Constitutional: Negative for decreased appetite.   HENT:  Negative for ear discharge.    Eyes:  Negative for blurred vision.   Respiratory:  Negative for hemoptysis.    Endocrine:  Negative for polyphagia.   Hematologic/Lymphatic: Negative for adenopathy.   Skin:  Negative for color change.   Musculoskeletal:  Negative for joint swelling.   Genitourinary:  Negative for bladder incontinence.   Neurological:  Negative for brief paralysis.   Psychiatric/Behavioral:  Negative for hallucinations.    Allergic/Immunologic: Negative for hives.     Objective:   Physical Exam  Constitutional:       Appearance: She is well-developed.   HENT:      Head: Normocephalic and atraumatic.   Eyes:      Conjunctiva/sclera: Conjunctivae normal.      Pupils: Pupils are equal, round, and reactive to light.   Cardiovascular:      Rate and Rhythm: Normal rate.      Pulses: Intact distal pulses.      Heart sounds: Normal heart sounds.   Pulmonary:      Effort: Pulmonary effort is normal.      Breath sounds: Normal breath sounds.   Abdominal:      General: Bowel sounds are normal.      Palpations: Abdomen is soft.   Musculoskeletal:         General: Normal range of motion.      Cervical back: Normal range of motion and neck supple.   Skin:     General: Skin is warm and dry.   Neurological:      Mental Status: She is alert and oriented to person, place, and time.       Assessment:      1. Persistent atrial fibrillation    2. Atrial fibrillation with rapid ventricular response        Plan:     Staying in NSR after recent CV - stop xarelto after 1 month then ASA 81 qd (CHADS-vasc 1)  OV 6 months  If A-fib returns consider adding flecainide or EP referral for PVI

## 2023-07-25 ENCOUNTER — OFFICE VISIT (OUTPATIENT)
Dept: FAMILY MEDICINE | Facility: CLINIC | Age: 58
End: 2023-07-25
Payer: MEDICARE

## 2023-07-25 ENCOUNTER — TELEPHONE (OUTPATIENT)
Dept: FAMILY MEDICINE | Facility: CLINIC | Age: 58
End: 2023-07-25

## 2023-07-25 ENCOUNTER — PATIENT OUTREACH (OUTPATIENT)
Dept: ADMINISTRATIVE | Facility: HOSPITAL | Age: 58
End: 2023-07-25
Payer: MEDICARE

## 2023-07-25 VITALS
TEMPERATURE: 98 F | DIASTOLIC BLOOD PRESSURE: 60 MMHG | HEIGHT: 63 IN | OXYGEN SATURATION: 95 % | SYSTOLIC BLOOD PRESSURE: 98 MMHG | WEIGHT: 185.19 LBS | BODY MASS INDEX: 32.81 KG/M2 | HEART RATE: 71 BPM

## 2023-07-25 DIAGNOSIS — Z13.220 ENCOUNTER FOR LIPID SCREENING FOR CARDIOVASCULAR DISEASE: ICD-10-CM

## 2023-07-25 DIAGNOSIS — Z12.39 ENCOUNTER FOR SCREENING FOR MALIGNANT NEOPLASM OF BREAST, UNSPECIFIED SCREENING MODALITY: ICD-10-CM

## 2023-07-25 DIAGNOSIS — I48.19 PERSISTENT ATRIAL FIBRILLATION: ICD-10-CM

## 2023-07-25 DIAGNOSIS — D70.9 NEUTROPENIA, UNSPECIFIED TYPE: ICD-10-CM

## 2023-07-25 DIAGNOSIS — Z87.828 HISTORY OF TEAR OF ACL (ANTERIOR CRUCIATE LIGAMENT): ICD-10-CM

## 2023-07-25 DIAGNOSIS — Z13.6 ENCOUNTER FOR LIPID SCREENING FOR CARDIOVASCULAR DISEASE: ICD-10-CM

## 2023-07-25 DIAGNOSIS — M72.2 PLANTAR FASCIITIS: ICD-10-CM

## 2023-07-25 DIAGNOSIS — Z00.00 HEALTHCARE MAINTENANCE: Primary | ICD-10-CM

## 2023-07-25 DIAGNOSIS — Z86.39 H/O: OBESITY: ICD-10-CM

## 2023-07-25 DIAGNOSIS — Z12.31 ENCOUNTER FOR SCREENING MAMMOGRAM FOR MALIGNANT NEOPLASM OF BREAST: ICD-10-CM

## 2023-07-25 DIAGNOSIS — Z12.11 COLON CANCER SCREENING: ICD-10-CM

## 2023-07-25 PROCEDURE — 3074F SYST BP LT 130 MM HG: CPT | Mod: CPTII,S$GLB,, | Performed by: INTERNAL MEDICINE

## 2023-07-25 PROCEDURE — 1160F RVW MEDS BY RX/DR IN RCRD: CPT | Mod: CPTII,S$GLB,, | Performed by: INTERNAL MEDICINE

## 2023-07-25 PROCEDURE — 1159F PR MEDICATION LIST DOCUMENTED IN MEDICAL RECORD: ICD-10-PCS | Mod: CPTII,S$GLB,, | Performed by: INTERNAL MEDICINE

## 2023-07-25 PROCEDURE — 3078F PR MOST RECENT DIASTOLIC BLOOD PRESSURE < 80 MM HG: ICD-10-PCS | Mod: CPTII,S$GLB,, | Performed by: INTERNAL MEDICINE

## 2023-07-25 PROCEDURE — 1159F MED LIST DOCD IN RCRD: CPT | Mod: CPTII,S$GLB,, | Performed by: INTERNAL MEDICINE

## 2023-07-25 PROCEDURE — 3008F BODY MASS INDEX DOCD: CPT | Mod: CPTII,S$GLB,, | Performed by: INTERNAL MEDICINE

## 2023-07-25 PROCEDURE — 99999 PR PBB SHADOW E&M-EST. PATIENT-LVL V: ICD-10-PCS | Mod: PBBFAC,,, | Performed by: INTERNAL MEDICINE

## 2023-07-25 PROCEDURE — 3078F DIAST BP <80 MM HG: CPT | Mod: CPTII,S$GLB,, | Performed by: INTERNAL MEDICINE

## 2023-07-25 PROCEDURE — 1160F PR REVIEW ALL MEDS BY PRESCRIBER/CLIN PHARMACIST DOCUMENTED: ICD-10-PCS | Mod: CPTII,S$GLB,, | Performed by: INTERNAL MEDICINE

## 2023-07-25 PROCEDURE — 99204 PR OFFICE/OUTPT VISIT, NEW, LEVL IV, 45-59 MIN: ICD-10-PCS | Mod: S$GLB,,, | Performed by: INTERNAL MEDICINE

## 2023-07-25 PROCEDURE — 3074F PR MOST RECENT SYSTOLIC BLOOD PRESSURE < 130 MM HG: ICD-10-PCS | Mod: CPTII,S$GLB,, | Performed by: INTERNAL MEDICINE

## 2023-07-25 PROCEDURE — 3008F PR BODY MASS INDEX (BMI) DOCUMENTED: ICD-10-PCS | Mod: CPTII,S$GLB,, | Performed by: INTERNAL MEDICINE

## 2023-07-25 PROCEDURE — 99204 OFFICE O/P NEW MOD 45 MIN: CPT | Mod: S$GLB,,, | Performed by: INTERNAL MEDICINE

## 2023-07-25 PROCEDURE — 99999 PR PBB SHADOW E&M-EST. PATIENT-LVL V: CPT | Mod: PBBFAC,,, | Performed by: INTERNAL MEDICINE

## 2023-07-25 RX ORDER — ASPIRIN 325 MG
50 TABLET, DELAYED RELEASE (ENTERIC COATED) ORAL DAILY
COMMUNITY

## 2023-07-25 RX ORDER — DICLOFENAC SODIUM 10 MG/G
GEL TOPICAL
Qty: 100 G | Refills: 5 | Status: SHIPPED | OUTPATIENT
Start: 2023-07-25

## 2023-07-25 RX ORDER — MECOBALAMIN 1000 MCG
TABLET,CHEWABLE ORAL
COMMUNITY

## 2023-07-25 NOTE — LETTER
AUTHORIZATION FOR RELEASE OF   CONFIDENTIAL INFORMATION    Dear [facility],    We are seeing Shamika Padilla, date of birth 1965, in the clinic at [facility]. Tim Moon MD is the patient's PCP. Shamika Padilla has an outstanding lab/procedure at the time we reviewed her chart. In order to help keep her health information updated, she has authorized us to request the following medical record(s):        (  )  MAMMOGRAM                                      ( X )  COLONOSCOPY      (  )  PAP SMEAR                                          (  )  OUTSIDE LAB RESULTS     (  )  DEXA SCAN                                          (  )  EYE EXAM            (  )  FOOT EXAM                                          (  )  ENTIRE RECORD     (  )  OUTSIDE IMMUNIZATIONS                 (  )  _______________         Please fax records to [facility], Kiran K Anand, MD, FAX [phone] [address] [facility] [address] [facility] [address]       If you have any questions, please contact Philip Ramirez MA,[facility] at [phone].              Patient Name: Shamika Padilla  : 1965  Patient Phone #: [phone]

## 2023-07-25 NOTE — TELEPHONE ENCOUNTER
----- Message from Tim Moon MD sent at 7/25/2023  2:28 PM CDT -----  Please get colon socpy and path from [facility]

## 2023-07-25 NOTE — LETTER
AUTHORIZATION FOR RELEASE OF   CONFIDENTIAL INFORMATION    Dear Dr.Labadie, MD,    We are seeing Shamika Padilla, date of birth 1965, in the clinic at [facility]. Tim Moon MD is the patient's PCP. Shamika Padilla has an outstanding lab/procedure at the time we reviewed her chart. In order to help keep her health information updated, she has authorized us to request the following medical record(s):        (  )  MAMMOGRAM                                      (  )  COLONOSCOPY      ( X )  PAP SMEAR                                          (  )  OUTSIDE LAB RESULTS     (  )  DEXA SCAN                                          (  )  EYE EXAM            (  )  FOOT EXAM                                          (  )  ENTIRE RECORD     (  )  OUTSIDE IMMUNIZATIONS                 (  )  _______________         Please fax records to [facility], Kiran K Anand, MD, FAX [phone] [address] [facility] [address]       If you have any questions, please contact Philip Ramirez MA,[facility] at [phone].             Patient Name: Shamika Padilla  : 1965  Patient Phone #: [phone]

## 2023-07-25 NOTE — PROGRESS NOTES
"HISTORY OF PRESENT ILLNESS:  Shamika Padilla is a 57 y.o. female who presents to the clinic today for Establish Care    My first encounter with patient.  Seen in ED 7/10/23 for new onset atrial fib.  Noted from ED documentation:  "Assumed care patient after initial evaluation, pending additional lab work.  Additional lab work appears unremarkable, EKG shows AFib with RVR.  Initial diltiazem bolus significantly improving symptoms, blood pressure remained stable.  Cardiology evaluated patient at bedside, recommending Lovenox, metoprolol, cardioversion.  Patient consented for sedation as noted above, cardiology bedside to assist with procedural sedation for cardioversion of atrial fibrillation.  Patient successfully cardioverted to normal sinus rhythm.  She will be discharged on oral metoprolol and Xarelto and have follow-up in clinic.  Echocardiogram appears unremarkable.  Patient was discharged to home improved and stable.  All questions were answered and patient in understanding of follow-up plan."  She was seen in cardiology clinic 7/14/23.  Recommended to stop Xarelto after one month with transition to ASA 81 mg daily with CHADS-Vasc score of 1.  On Toprol XL 50 mg daily.    No further symptoms since then.    Notes left ACL injury 12 yrs ago as a result of ski injury.  Occasional brace or ice to the area.  Occasional naproxen.  Notes meniscus surgery 2012.  Symptoms are stable at present.    Notes left foot pain more at the heel.    PAST MEDICAL HISTORY:  Past Medical History:   Diagnosis Date    ACL injury tear        PAST SURGICAL HISTORY:  Past Surgical History:   Procedure Laterality Date    CARDIOVERSION N/A 07/10/2023    Procedure: Cardioversion;  Surgeon: Jose Luis Bowen MD;  Location: [facility] CATH LAB;  Service: Cardiology;  Laterality: N/A;    KNEE SURGERY Left     TREATMENT OF CARDIAC ARRHYTHMIA N/A 07/10/2023    Procedure: Cardioversion or Defibrillation;  Surgeon: Jose Luis Bowen MD;  Location: [facility] " CATH LAB;  Service: Cardiology;  Laterality: N/A;       SOCIAL HISTORY:  Social History     Socioeconomic History    Marital status:     Number of children: 3   Occupational History    Occupation: prior in food and beverage   Tobacco Use    Smoking status: Never    Smokeless tobacco: Never   Substance and Sexual Activity    Alcohol use: Yes     Comment: 1-2 times per month - 2 drinks/per occasion       FAMILY HISTORY:  Family History   Problem Relation Age of Onset    Lung cancer Mother 60        smoker    Heart attack Father 68    Heart disease Father     Breast cancer Maternal Grandmother 60    Lung cancer Paternal Grandmother         smoker       ALLERGIES AND MEDICATIONS: updated and reviewed.  Review of patient's allergies indicates:  No Known Allergies  Medication List with Changes/Refills   New Medications    DICLOFENAC SODIUM (VOLTAREN) 1 % GEL    Lower extremities: Apply the gel (4 g) to the affected area 4 times daily. Do not apply more than 16 g daily to any one affected joint of the lower extremities. Upper extremities: Apply the gel (2 g) to the affected area 4 times daily. Do not apply more than 8 g daily to any one affected joint of the upper extremities. Total dose should not exceed 32 g per day, overall affected joints.   Current Medications    ASPIRIN (ECOTRIN) 81 MG EC TABLET    Take 1 tablet (81 mg total) by mouth once daily.    CO-ENZYME Q-10 50 MG CAPSULE    Take 50 mg by mouth once daily.    MECOBALAMIN, VITAMIN B12, (B12 ACTIVE) 1,000 MCG CHEW    Take by mouth.    METOPROLOL SUCCINATE (TOPROL-XL) 50 MG 24 HR TABLET    Take 1 tablet (50 mg total) by mouth once daily.    MULTIVITAMIN CAPSULE    Take 1 capsule by mouth once daily.    RIVAROXABAN (XARELTO) 20 MG TAB    Take 1 tablet (20 mg total) by mouth daily with dinner or evening meal.          CARE TEAM:  Patient Care Team:  Tim Moon MD as PCP - General (Internal Medicine)  Eugenio C. Labadie, MD (Obstetrics)  [facility] (Gastroenterology)         REVIEW OF SYSTEMS:  Review of Systems   Constitutional:  Negative for activity change and unexpected weight change.   HENT:  Negative for hearing loss, rhinorrhea and trouble swallowing.    Eyes:  Negative for discharge and visual disturbance.   Respiratory:  Negative for chest tightness and wheezing.    Cardiovascular:  Negative for chest pain and palpitations.   Gastrointestinal:  Negative for blood in stool, constipation, diarrhea and vomiting.   Endocrine: Negative for polydipsia and polyuria.   Genitourinary:  Negative for difficulty urinating, dysuria, hematuria and menstrual problem.   Musculoskeletal:  Positive for arthralgias. Negative for joint swelling and neck pain.   Neurological:  Negative for weakness and headaches.   Psychiatric/Behavioral:  Negative for confusion and dysphoric mood.    All other systems reviewed and are negative.      PHYSICAL EXAM:   Vitals:    07/25/23 1408   BP: 98/60   Pulse: 71   Temp: 98.4 °F (36.9 °C)             Body mass index is 32.8 kg/m².     General appearance - alert, well appearing, and in no distress, oriented to person, place, and time, and obese  Mental status - normal mood, behavior, speech, dress, motor activity, and thought processes  Eyes - sclera anicteric, left eye normal, right eye normal  Ears - bilateral TM's and external ear canals normal  Mouth - mucous membranes moist, pharynx normal without lesions  Chest - clear to auscultation, no wheezes, rales or rhonchi, symmetric air entry  Heart - normal rate, regular rhythm, normal S1, S2, no murmurs, rubs, clicks or gallops  Neurological - alert, oriented, normal speech, no focal findings or movement disorder noted  Extremities - peripheral pulses normal, no pedal edema, no clubbing or cyanosis      ASSESSMENT AND PLAN:  Healthcare maintenance  -     Hemoglobin A1C; Future; Expected date: 07/25/2023  -     Comprehensive Metabolic Panel;  Future; Expected date: 07/25/2023  -     Lipid Panel; Future; Expected date: 07/25/2023  -     CBC Auto Differential; Future; Expected date: 07/25/2023  -     TSH; Future; Expected date: 07/25/2023  -     Vitamin D; Future; Expected date: 07/25/2023  -     Hepatitis C Antibody; Future; Expected date: 07/25/2023  -     HIV 1/2 Ag/Ab (4th Gen); Future; Expected date: 07/25/2023  -     Ambulatory referral/consult to Obstetrics / Gynecology; Future; Expected date: 08/01/2023  -     Mammo Digital Screening Bilat w/ Toro; Future; Expected date: 07/25/2023    Persistent atrial fibrillation       - In normal sinus rhythm today s/p cardioversion in ED.  Transition to daily ASA after one month and continue Toprol XL at this time.    Plantar fasciitis  -     diclofenac sodium (VOLTAREN) 1 % Gel; Lower extremities: Apply the gel (4 g) to the affected area 4 times daily. Do not apply more than 16 g daily to any one affected joint of the lower extremities. Upper extremities: Apply the gel (2 g) to the affected area 4 times daily. Do not apply more than 8 g daily to any one affected joint of the upper extremities. Total dose should not exceed 32 g per day, overall affected joints.  Dispense: 100 g; Refill: 5  - Home exercises printout provided.  Counseled for supportive foot wear and conservative measures.    History of tear of ACL (anterior cruciate ligament)       - Stable.  Refer to ortho if symptoms occur.    Colon cancer screening      - Request records [facility].    Encounter for screening for malignant neoplasm of breast, unspecified screening modality  Encounter for screening mammogram for malignant neoplasm of breast  -     Mammo Digital Screening Bilat w/ Toro; Future; Expected date: 07/25/2023    H/O: obesity  Body mass index (BMI) 32.0-32.9, adult  -     Hemoglobin A1C; Future; Expected date: 07/25/2023  -     Lipid Panel; Future; Expected date: 07/25/2023  -     CBC Auto Differential; Future; Expected date:  07/25/2023  -     TSH; Future; Expected date: 07/25/2023  -     Vitamin D; Future; Expected date: 07/25/2023  -     Mammo Digital Screening Bilat w/ Toro; Future; Expected date: 07/25/2023  -     Vitamin D; Future; Expected date: 07/25/2023  - Counseled for lifestyle management.    Encounter for lipid screening for cardiovascular disease  -     Lipid Panel; Future; Expected date: 07/25/2023    Neutropenia, unspecified type       - Follow up repeat CBC.            Follow up 6 months or sooner as needed.

## 2023-07-25 NOTE — LETTER
AUTHORIZATION FOR RELEASE OF   CONFIDENTIAL INFORMATION    Dear [facility],    We are seeing Shamika Padilla, date of birth 1965, in the clinic at [facility]. Tim Moon MD is the patient's PCP. Shamika Padilla has an outstanding lab/procedure at the time we reviewed her chart. In order to help keep her health information updated, she has authorized us to request the following medical record(s):        (  )  MAMMOGRAM                                      ( X )  COLONOSCOPY      (  )  PAP SMEAR                                          (  )  OUTSIDE LAB RESULTS     (  )  DEXA SCAN                                          (  )  EYE EXAM            (  )  FOOT EXAM                                          ( X )  ENTIRE RECORD     (  )  OUTSIDE IMMUNIZATIONS                 ( X )  _Path______________         Please fax records to [facility], Kiran K Anand, MD, Fax [phone] [address] [facility] [address] .     If you have any questions, please contact Philip Ramirez   at [phone].           Patient Name: Shamika Padilla  : 1965  Patient Phone #: [phone]

## 2023-07-26 ENCOUNTER — PATIENT OUTREACH (OUTPATIENT)
Dept: ADMINISTRATIVE | Facility: HOSPITAL | Age: 58
End: 2023-07-26
Payer: MEDICARE

## 2023-07-28 ENCOUNTER — PATIENT OUTREACH (OUTPATIENT)
Dept: ADMINISTRATIVE | Facility: HOSPITAL | Age: 58
End: 2023-07-28
Payer: MEDICARE

## 2023-07-28 ENCOUNTER — PATIENT MESSAGE (OUTPATIENT)
Dept: ADMINISTRATIVE | Facility: HOSPITAL | Age: 58
End: 2023-07-28
Payer: MEDICARE

## 2023-07-31 ENCOUNTER — LAB VISIT (OUTPATIENT)
Dept: LAB | Facility: HOSPITAL | Age: 58
End: 2023-07-31
Attending: INTERNAL MEDICINE
Payer: MEDICARE

## 2023-07-31 DIAGNOSIS — Z13.6 ENCOUNTER FOR LIPID SCREENING FOR CARDIOVASCULAR DISEASE: ICD-10-CM

## 2023-07-31 DIAGNOSIS — Z00.00 HEALTHCARE MAINTENANCE: ICD-10-CM

## 2023-07-31 DIAGNOSIS — Z13.220 ENCOUNTER FOR LIPID SCREENING FOR CARDIOVASCULAR DISEASE: ICD-10-CM

## 2023-07-31 DIAGNOSIS — Z86.39 H/O: OBESITY: ICD-10-CM

## 2023-07-31 LAB
25(OH)D3+25(OH)D2 SERPL-MCNC: 23 NG/ML (ref 30–96)
ALBUMIN SERPL BCP-MCNC: 4 G/DL (ref 3.5–5.2)
ALP SERPL-CCNC: 88 U/L (ref 55–135)
ALT SERPL W/O P-5'-P-CCNC: 31 U/L (ref 10–44)
ANION GAP SERPL CALC-SCNC: 6 MMOL/L (ref 8–16)
AST SERPL-CCNC: 21 U/L (ref 10–40)
BASOPHILS # BLD AUTO: 0.06 K/UL (ref 0–0.2)
BASOPHILS NFR BLD: 1.5 % (ref 0–1.9)
BILIRUB SERPL-MCNC: 0.7 MG/DL (ref 0.1–1)
BUN SERPL-MCNC: 16 MG/DL (ref 6–20)
CALCIUM SERPL-MCNC: 9.5 MG/DL (ref 8.7–10.5)
CHLORIDE SERPL-SCNC: 108 MMOL/L (ref 95–110)
CHOLEST SERPL-MCNC: 197 MG/DL (ref 120–199)
CHOLEST/HDLC SERPL: 3.1 {RATIO} (ref 2–5)
CO2 SERPL-SCNC: 28 MMOL/L (ref 23–29)
CREAT SERPL-MCNC: 0.7 MG/DL (ref 0.5–1.4)
DIFFERENTIAL METHOD: ABNORMAL
EOSINOPHIL # BLD AUTO: 0.1 K/UL (ref 0–0.5)
EOSINOPHIL NFR BLD: 3.4 % (ref 0–8)
ERYTHROCYTE [DISTWIDTH] IN BLOOD BY AUTOMATED COUNT: 12.1 % (ref 11.5–14.5)
EST. GFR  (NO RACE VARIABLE): >60 ML/MIN/1.73 M^2
ESTIMATED AVG GLUCOSE: 103 MG/DL (ref 68–131)
GLUCOSE SERPL-MCNC: 101 MG/DL (ref 70–110)
HBA1C MFR BLD: 5.2 % (ref 4–5.6)
HCT VFR BLD AUTO: 37 % (ref 37–48.5)
HCV AB SERPL QL IA: NORMAL
HDLC SERPL-MCNC: 64 MG/DL (ref 40–75)
HDLC SERPL: 32.5 % (ref 20–50)
HGB BLD-MCNC: 12 G/DL (ref 12–16)
HIV 1+2 AB+HIV1 P24 AG SERPL QL IA: NORMAL
IMM GRANULOCYTES # BLD AUTO: 0 K/UL (ref 0–0.04)
IMM GRANULOCYTES NFR BLD AUTO: 0 % (ref 0–0.5)
LDLC SERPL CALC-MCNC: 118 MG/DL (ref 63–159)
LYMPHOCYTES # BLD AUTO: 1.8 K/UL (ref 1–4.8)
LYMPHOCYTES NFR BLD: 43.6 % (ref 18–48)
MCH RBC QN AUTO: 30.5 PG (ref 27–31)
MCHC RBC AUTO-ENTMCNC: 32.4 G/DL (ref 32–36)
MCV RBC AUTO: 94 FL (ref 82–98)
MONOCYTES # BLD AUTO: 0.4 K/UL (ref 0.3–1)
MONOCYTES NFR BLD: 10.5 % (ref 4–15)
NEUTROPHILS # BLD AUTO: 1.7 K/UL (ref 1.8–7.7)
NEUTROPHILS NFR BLD: 41 % (ref 38–73)
NONHDLC SERPL-MCNC: 133 MG/DL
NRBC BLD-RTO: 0 /100 WBC
PLATELET # BLD AUTO: 164 K/UL (ref 150–450)
PMV BLD AUTO: 10.8 FL (ref 9.2–12.9)
POTASSIUM SERPL-SCNC: 4.4 MMOL/L (ref 3.5–5.1)
PROT SERPL-MCNC: 7.1 G/DL (ref 6–8.4)
RBC # BLD AUTO: 3.93 M/UL (ref 4–5.4)
SODIUM SERPL-SCNC: 142 MMOL/L (ref 136–145)
TRIGL SERPL-MCNC: 75 MG/DL (ref 30–150)
TSH SERPL DL<=0.005 MIU/L-ACNC: 1.66 UIU/ML (ref 0.4–4)
WBC # BLD AUTO: 4.11 K/UL (ref 3.9–12.7)

## 2023-07-31 PROCEDURE — 36415 COLL VENOUS BLD VENIPUNCTURE: CPT | Mod: PN | Performed by: INTERNAL MEDICINE

## 2023-07-31 PROCEDURE — 86803 HEPATITIS C AB TEST: CPT | Performed by: INTERNAL MEDICINE

## 2023-07-31 PROCEDURE — 80061 LIPID PANEL: CPT | Performed by: INTERNAL MEDICINE

## 2023-07-31 PROCEDURE — 82306 VITAMIN D 25 HYDROXY: CPT | Performed by: INTERNAL MEDICINE

## 2023-07-31 PROCEDURE — 83036 HEMOGLOBIN GLYCOSYLATED A1C: CPT | Performed by: INTERNAL MEDICINE

## 2023-07-31 PROCEDURE — 85025 COMPLETE CBC W/AUTO DIFF WBC: CPT | Performed by: INTERNAL MEDICINE

## 2023-07-31 PROCEDURE — 87389 HIV-1 AG W/HIV-1&-2 AB AG IA: CPT | Performed by: INTERNAL MEDICINE

## 2023-07-31 PROCEDURE — 80053 COMPREHEN METABOLIC PANEL: CPT | Performed by: INTERNAL MEDICINE

## 2023-07-31 PROCEDURE — 84443 ASSAY THYROID STIM HORMONE: CPT | Performed by: INTERNAL MEDICINE

## 2023-08-01 ENCOUNTER — TELEPHONE (OUTPATIENT)
Dept: ADMINISTRATIVE | Facility: HOSPITAL | Age: 58
End: 2023-08-01
Payer: MEDICARE

## 2023-09-08 ENCOUNTER — PATIENT OUTREACH (OUTPATIENT)
Dept: ADMINISTRATIVE | Facility: HOSPITAL | Age: 58
End: 2023-09-08
Payer: MEDICARE

## 2023-09-08 NOTE — LETTER
AUTHORIZATION FOR RELEASE OF   CONFIDENTIAL INFORMATION    Dear Dr.Labadie, MD,    We are seeing Shamika Padilla, date of birth 1965, in the clinic at [facility]. Tim Moon MD is the patient's PCP. Shamika Padilla has an outstanding lab/procedure at the time we reviewed her chart. In order to help keep her health information updated, she has authorized us to request the following medical record(s):        ( X )  MAMMOGRAM                                      (  )  COLONOSCOPY      ( X )  PAP SMEAR                                          (  )  OUTSIDE LAB RESULTS     (  )  DEXA SCAN                                          (  )  EYE EXAM            (  )  FOOT EXAM                                          (  )  ENTIRE RECORD     (  )  OUTSIDE IMMUNIZATIONS                 (  )  _______________         Please fax records to [facility], Anand, Kiran K., MD, FAX [phone] [address] [facility] [address]       If you have any questions, please contact Philip Ramirez MA,[facility] at [phone].             Patient Name: Shamika Padilla  : 1965  Patient Phone #: [phone]

## 2023-09-08 NOTE — PROGRESS NOTES
Colonoscopy completed 03/11/16, due 2026.Gap report updated. ODALIS sent requesting mammogram and pap smear. Immunization's updated/triggered.

## 2023-09-12 ENCOUNTER — PATIENT OUTREACH (OUTPATIENT)
Dept: ADMINISTRATIVE | Facility: HOSPITAL | Age: 58
End: 2023-09-12
Payer: MEDICARE

## 2023-10-02 ENCOUNTER — PATIENT MESSAGE (OUTPATIENT)
Dept: FAMILY MEDICINE | Facility: CLINIC | Age: 58
End: 2023-10-02
Payer: MEDICARE

## 2023-10-03 DIAGNOSIS — Z12.31 ENCOUNTER FOR SCREENING MAMMOGRAM FOR MALIGNANT NEOPLASM OF BREAST: ICD-10-CM

## 2023-10-03 DIAGNOSIS — Z12.39 ENCOUNTER FOR SCREENING FOR MALIGNANT NEOPLASM OF BREAST, UNSPECIFIED SCREENING MODALITY: Primary | ICD-10-CM

## 2023-10-04 ENCOUNTER — TELEPHONE (OUTPATIENT)
Dept: FAMILY MEDICINE | Facility: CLINIC | Age: 58
End: 2023-10-04
Payer: MEDICARE

## 2023-10-04 NOTE — TELEPHONE ENCOUNTER
----- Message from Tim Moon MD sent at 10/3/2023  4:48 PM CDT -----  Regarding: RE: Request for Annual Mammogram  Ordered please schedule  ----- Message -----  From: Tabitha Anderson MA  Sent: 10/2/2023   3:24 PM CDT  To: Tmi Moon MD  Subject: FW: Request for Annual Mammogram                   ----- Message -----  From: Yvette Whittaker  Sent: 10/2/2023  12:37 PM CDT  To: Red [facility] Staff  Subject: Request for Annual Mammogram                     Type:  Mammogram    Caller is requesting to schedule their annual mammogram appointment.  Order is not listed in EPIC.  Please enter order and contact patient to schedule.    Name of Caller: Self     Where would they like the mammogram performed? [facility]     Would the patient rather a call back or a response via [facility]? Call     Best Call Back Number: .[phone]

## 2023-11-17 ENCOUNTER — PATIENT OUTREACH (OUTPATIENT)
Dept: ADMINISTRATIVE | Facility: HOSPITAL | Age: 58
End: 2023-11-17
Payer: MEDICARE

## 2023-11-17 NOTE — PROGRESS NOTES
Mammogram completed 10/3/23. Immunization's updated/triggered. Gap report updated.Gap report updated.

## 2024-07-10 RX ORDER — METOPROLOL SUCCINATE 50 MG/1
50 TABLET, EXTENDED RELEASE ORAL DAILY
Qty: 90 TABLET | Refills: 3 | Status: SHIPPED | OUTPATIENT
Start: 2024-07-10

## 2024-07-10 NOTE — TELEPHONE ENCOUNTER
----- Message from Zina Kim MA sent at 7/10/2024  3:19 PM CDT -----  Type: Patient Call Back    Who called:Self    What is the request in detail: pt. Will need a refill of her medication.. wasn't able to make a appt. Until Sept. With the provider..     metoprolol succinate (TOPROL-XL) 50 MG 24 hr tablet    Can the clinic reply by [facility]?NO    Would the patient rather a call back or a response via [facility]? Yes, call     Best call back number: [phone]

## 2024-07-15 ENCOUNTER — TELEPHONE (OUTPATIENT)
Dept: CARDIOLOGY | Facility: CLINIC | Age: 59
End: 2024-07-15
Payer: MEDICARE

## 2024-07-15 NOTE — TELEPHONE ENCOUNTER
----- Message from Danika Alarcon MA sent at 7/15/2024 12:49 PM CDT -----  Regarding: FW: call back  Please assist  ----- Message -----  From: Lora Bernard  Sent: 7/15/2024  11:55 AM CDT  To: Andrés Amaya Staff  Subject: call back                                        Type: Patient Call Back    Who called: pt     What is the request in detail: requesting call to see if she can reschedule her appt for a slot between tomorrow and August, cannot make appt tomorrow but needs to be seen this week if possible     Can the clinic reply by [facility]?no    Would the patient rather a call back or a response via [facility]? call    Best call back number: [phone]     Additional Information:  
Appointment rescheduled. silvana  
1

## 2024-09-13 ENCOUNTER — OFFICE VISIT (OUTPATIENT)
Dept: CARDIOLOGY | Facility: CLINIC | Age: 59
End: 2024-09-13
Payer: MEDICARE

## 2024-09-13 VITALS
SYSTOLIC BLOOD PRESSURE: 116 MMHG | WEIGHT: 192.56 LBS | HEART RATE: 77 BPM | OXYGEN SATURATION: 97 % | BODY MASS INDEX: 32.87 KG/M2 | HEIGHT: 64 IN | DIASTOLIC BLOOD PRESSURE: 78 MMHG

## 2024-09-13 DIAGNOSIS — I48.19 PERSISTENT ATRIAL FIBRILLATION: ICD-10-CM

## 2024-09-13 LAB
OHS QRS DURATION: 76 MS
OHS QTC CALCULATION: 403 MS

## 2024-09-13 PROCEDURE — 99999 PR PBB SHADOW E&M-EST. PATIENT-LVL III: CPT | Mod: PBBFAC,,, | Performed by: INTERNAL MEDICINE

## 2024-09-13 NOTE — PROGRESS NOTES
Subjective   Patient ID:  Shamika Padilla is a 59 y.o. female who presents for follow-up of Follow-up      HPI      A-fib 7/10/23 - CV         7/10/23 CV - Sedation by ER physician. 200J shock converted A-fib to NSR 80s     Echo 7/10/23  The left ventricle is normal in size with normal systolic function.  The estimated ejection fraction is 60%.  Normal left ventricular diastolic function.  Normal right ventricular size with normal right ventricular systolic function.  Mild left atrial enlargement.  Normal central venous pressure (3 mmHg).  The estimated PA systolic pressure is 15 mmHg.     Went to the ER 7/10/23  56 yo F presenting with acute onset of heart racing palpitations and neck tightness 40 min PTA. States this woke her up from her sleep. Also reports associated chest discomfort and tightness on ambulation. No other exacerbating or alleviating factors. Denies recent dyspnea, PND, SANFORD, abdominal pain, nausea, chest pain, other associated symptoms. Nonsmoker. No alcohol or illicit drug use. No personal h/o PE, DVT, surgeries in the last 4 mo or other recent immobilizations. No PMHx, but has not f/u w/ a PCP in years. No personal h/o Afib.      Initially rates improved with IV diltiazem - going back up now  Denies CP or SOB  On no medications  EKG A-fib 164 NSSTT changes  BNP 50  Troponin negative   Persistent atrial fibrillation  New Dx. Woke her from sleep. Rates improved with IV diltiazem but going back up. Check echo and TSH. Full dose lovenox. PO BB. Discussed CV in ER and patient is in agreement. No Need for MECHE with < 48 duration. CHADS-vasc 1 - xarelto for 1 month at discharge then ASA     7/14/23 Denies heart racing or palpitations since discharge  Tolerating toprol  EKG NSR - ok  Staying in NSR after recent CV - stop xarelto after 1 month then ASA 81 qd (CHADS-vasc 1)  OV 6 months  If A-fib returns consider adding flecainide or EP referral for PVI    9/13/24 Denies CP or SOB  Rarely gets brief  palpitations  EKG NSR - ok      Review of Systems   Constitutional: Negative for decreased appetite.   HENT:  Negative for ear discharge.    Eyes:  Negative for blurred vision.   Respiratory:  Negative for hemoptysis.    Endocrine: Negative for polyphagia.   Hematologic/Lymphatic: Negative for adenopathy.   Skin:  Negative for color change.   Musculoskeletal:  Negative for joint swelling.   Genitourinary:  Negative for bladder incontinence.   Neurological:  Negative for brief paralysis.   Psychiatric/Behavioral:  Negative for hallucinations.    Allergic/Immunologic: Negative for hives.          Objective     Physical Exam  Constitutional:       Appearance: She is well-developed.   HENT:      Head: Normocephalic and atraumatic.   Eyes:      Conjunctiva/sclera: Conjunctivae normal.      Pupils: Pupils are equal, round, and reactive to light.   Cardiovascular:      Rate and Rhythm: Normal rate.      Pulses: Intact distal pulses.      Heart sounds: Normal heart sounds.   Pulmonary:      Effort: Pulmonary effort is normal.      Breath sounds: Normal breath sounds.   Abdominal:      General: Bowel sounds are normal.      Palpations: Abdomen is soft.   Musculoskeletal:         General: Normal range of motion.      Cervical back: Normal range of motion and neck supple.   Skin:     General: Skin is warm and dry.   Neurological:      Mental Status: She is alert and oriented to person, place, and time.            Assessment and Plan     1. Persistent atrial fibrillation        Plan:     Clinically staying in NSR  Recommend a home mobile device to trak rhythm  OV 1 year    Advance Care Planning     Date: 09/13/2024  Patient did not wish or was not able to name a surrogate decision maker or provide an Advance Care Plan.

## 2025-03-21 ENCOUNTER — TELEPHONE (OUTPATIENT)
Dept: FAMILY MEDICINE | Facility: CLINIC | Age: 60
End: 2025-03-21
Payer: MEDICARE

## 2025-03-21 NOTE — TELEPHONE ENCOUNTER
Placed a call to the pt, but no answer.  I left message  and information ( number) for pt to call back.  She is asking for an earlier appointment

## 2025-03-21 NOTE — TELEPHONE ENCOUNTER
----- Message from Héctor sent at 3/21/2025 11:10 AM CDT -----  Regarding: self  Type: Sooner Appointment RequestPatient is requesting a sooner appointment. Patient declined first available appointment listed as well as another facility and provider. Patient will not accept being placed on the waitlist and is requesting a message to be sent to the doctor.Name of caller:selfWhne is the first available appointment:July. Patient wants to be seen in AprilSymptoms:Would the patient rather a call back or a response via [facility] call back number:[phone] Information:

## 2025-03-24 DIAGNOSIS — Z00.00 ENCOUNTER FOR MEDICARE ANNUAL WELLNESS EXAM: ICD-10-CM

## 2025-03-27 ENCOUNTER — OFFICE VISIT (OUTPATIENT)
Dept: FAMILY MEDICINE | Facility: CLINIC | Age: 60
End: 2025-03-27
Payer: MEDICARE

## 2025-03-27 VITALS
BODY MASS INDEX: 33.31 KG/M2 | HEART RATE: 66 BPM | WEIGHT: 195.13 LBS | SYSTOLIC BLOOD PRESSURE: 116 MMHG | DIASTOLIC BLOOD PRESSURE: 72 MMHG | OXYGEN SATURATION: 97 % | HEIGHT: 64 IN | TEMPERATURE: 98 F

## 2025-03-27 DIAGNOSIS — Z00.00 ROUTINE GENERAL MEDICAL EXAMINATION AT A HEALTH CARE FACILITY: Primary | ICD-10-CM

## 2025-03-27 DIAGNOSIS — E66.9 OBESITY (BMI 30-39.9): ICD-10-CM

## 2025-03-27 DIAGNOSIS — Z86.39 HISTORY OF OBESITY: ICD-10-CM

## 2025-03-27 DIAGNOSIS — R10.2 PELVIC PAIN: ICD-10-CM

## 2025-03-27 DIAGNOSIS — Z13.6 ENCOUNTER FOR LIPID SCREENING FOR CARDIOVASCULAR DISEASE: ICD-10-CM

## 2025-03-27 DIAGNOSIS — Z13.220 ENCOUNTER FOR LIPID SCREENING FOR CARDIOVASCULAR DISEASE: ICD-10-CM

## 2025-03-27 DIAGNOSIS — I48.19 PERSISTENT ATRIAL FIBRILLATION: ICD-10-CM

## 2025-03-27 DIAGNOSIS — Z12.31 ENCOUNTER FOR SCREENING MAMMOGRAM FOR MALIGNANT NEOPLASM OF BREAST: ICD-10-CM

## 2025-03-27 DIAGNOSIS — Z78.0 MENOPAUSE: ICD-10-CM

## 2025-03-27 PROCEDURE — 3074F SYST BP LT 130 MM HG: CPT | Mod: CPTII,S$GLB,,

## 2025-03-27 PROCEDURE — 3008F BODY MASS INDEX DOCD: CPT | Mod: CPTII,S$GLB,,

## 2025-03-27 PROCEDURE — 1159F MED LIST DOCD IN RCRD: CPT | Mod: CPTII,S$GLB,,

## 2025-03-27 PROCEDURE — 99999 PR PBB SHADOW E&M-EST. PATIENT-LVL V: CPT | Mod: PBBFAC,,,

## 2025-03-27 PROCEDURE — 3078F DIAST BP <80 MM HG: CPT | Mod: CPTII,S$GLB,,

## 2025-03-27 PROCEDURE — 99396 PREV VISIT EST AGE 40-64: CPT | Mod: S$GLB,,,

## 2025-03-27 NOTE — PROGRESS NOTES
HPI     Shamika Padilla is a 59 y.o. female with multiple medical diagnoses as listed in the medical history and problem list that presents for   Chief Complaint   Patient presents with    Annual Exam       HPI  Pt presents to annual exam today. She has recently started changing her lifestyle changes, walking 30 minutes/day, eating more lean proteins and homemade food.  She has chronic left knee pain, sometimes rubs Aspercreme. She notes that she doesn't usually take anything else for. She reports regular appetite and sometimes expereinces intermittent constipation, however, has been making effort with fiber intake.  She notes intermittent pelvic pain, which was already assessed by her gynecologist last year. She was advised on transvaginal ultrasound, which wasn't done. She is interested in getting this. She denies any abnormal vaginal bleeding at this time. Describes it like a period cramp.  She is regularly followed up by cardiology for management of a-fib, she reports doing stable on Metoprolol, though sometimes feels like she feels more tired.        Assessment & Plan     1. Routine general medical examination at a health care facility  - Discussed outstanding care gaps. Pt feels like she may have already had her pneumonia shot at external pharmacy. Will check at home and provide records if so.  - Pt's mammogram and bone density scan to be faxed out to [facility]    - Vitamin D; Future  - TSH; Future  - CBC Auto Differential; Future  - Lipid Panel; Future  - Comprehensive Metabolic Panel; Future  - Hemoglobin A1C; Future    2. Persistent atrial fibrillation  - Regular rhythm and rate today, no chest pain, chest tightness, shortness of breath. Stable on Metoprolol, continue with current dose and maintain f/u w/cardiology    3. History of obesity  Behavioral counseling for obesity.  History:  - Patient's BMI: Estimated body mass index is 33.49 kg/m² as calculated from the following:    Height as of this encounter: 5'  "4" (1.626 m).    Weight as of this encounter: 88.5 kg (195 lb 1.7 oz).  - Patient reports challenges related to weight management.  - Patient meets criteria for obesity counseling: BMI >= 30 kg/m² or >= 25 kg/m² with associated comorbidities.  - Discussion focused on the benefits of sustained weight loss and its impact on health outcomes.  Plan:  Dietary Guidance: Recommended caloric intake tailored to patient needs, emphasizing nutrient-dense, low-calorie foods. Discussed portion control and meal planning strategies.  Physical Activity: Encouraged moderate physical activity for at least 150 minutes per week, tailored to patients current physical abilities and goals.  Behavioral Strategies: Addressed behaviors contributing to weight gain and set actionable goals, including [food diary, mindful eating, avoiding late-night snacks, etc.]  Motivational Counseling: Discussed the importance of intrinsic and extrinsic motivators in achieving sustained weight management. Reinforced positive behavior changes.  Follow-Up Plan: Patient instructed to return for follow-up for progress evaluation and continued counseling.  Time Spent: A total of 16 minutes was spent in face-to-face behavioral counseling focused on weight loss and obesity management.  Patient Understanding: Patient actively participated in the session and verbalized understanding of the discussed plan.    - TSH; Future  - Hemoglobin A1C; Future    4. Obesity (BMI 30-39.9)  Behavioral counseling for obesity.  History:  - Patient's BMI: Estimated body mass index is 33.49 kg/m² as calculated from the following:    Height as of this encounter: 5' 4" (1.626 m).    Weight as of this encounter: 88.5 kg (195 lb 1.7 oz).  - Patient reports challenges related to weight management.  - Patient meets criteria for obesity counseling: BMI >= 30 kg/m² or >= 25 kg/m² with associated comorbidities.  - Discussion focused on the benefits of sustained weight loss and its impact on " health outcomes.  Plan:  Dietary Guidance: Recommended caloric intake tailored to patient needs, emphasizing nutrient-dense, low-calorie foods. Discussed portion control and meal planning strategies.  Physical Activity: Encouraged moderate physical activity for at least 150 minutes per week, tailored to patients current physical abilities and goals.  Behavioral Strategies: Addressed behaviors contributing to weight gain and set actionable goals, including [food diary, mindful eating, avoiding late-night snacks, etc.]  Motivational Counseling: Discussed the importance of intrinsic and extrinsic motivators in achieving sustained weight management. Reinforced positive behavior changes.  Follow-Up Plan: Patient instructed to return for follow-up for progress evaluation and continued counseling.  Time Spent: A total of 16 minutes was spent in face-to-face behavioral counseling focused on weight loss and obesity management.  Patient Understanding: Patient actively participated in the session and verbalized understanding of the discussed plan.    - Vitamin D; Future  - TSH; Future  - CBC Auto Differential; Future    5. Pelvic pain  - No current pelvic pain, though pt is interested in getting US. Will send out orders to [facility] and patient advised to have annual f/u with her gynecologist    - US Transvaginal Non OB; Future  - US Transvaginal Non OB    6. Menopause  - Reports no HRT, currently stable management at home.    - DXA Bone Density Axial Skeleton 1 or more sites; Future  - DXA Bone Density Axial Skeleton 1 or more sites    7. Encounter for screening mammogram for malignant neoplasm of breast  - Orders sent to [facility]    - Mammo Digital Screening Bilat w/ Toro (XPD); Future  - Mammo Digital Screening Bilat w/ Toro (XPD)    8. Encounter for lipid screening for cardiovascular disease  - Stable, check lipid panel    - Lipid Panel; Future      --------------------------------------------    Health Maintenance         Date Due  Completion Date    Pneumococcal Vaccines (Age 50+) (1 of 1 - PCV) Never done ---    Mammogram 10/13/2024 10/13/2023    Colorectal Cancer Screening 03/11/2026 3/11/2016    Hemoglobin A1c (Diabetic Prevention Screening) 07/31/2026 7/31/2023    Cervical Cancer Screening 04/19/2027 4/19/2024    Lipid Panel 07/31/2028 7/31/2023    TETANUS VACCINE 06/02/2033 6/2/2023            Health maintenance reviewed    Follow Up:  Follow up in about 1 year (around 3/27/2026), or if symptoms worsen or fail to improve, for annual.    Exam     Review of Systems:  (as noted above)  Review of Systems   Constitutional:  Negative for chills, fatigue and fever.   HENT:  Negative for trouble swallowing.    Eyes:  Negative for visual disturbance.   Respiratory:  Negative for cough, chest tightness, shortness of breath and wheezing.    Cardiovascular:  Negative for chest pain and palpitations.   Gastrointestinal:  Negative for diarrhea, nausea and vomiting.   Genitourinary:  Positive for pelvic pain. Negative for difficulty urinating, dysuria, frequency, hematuria and urgency.   Musculoskeletal:  Positive for arthralgias (chronic). Negative for gait problem and myalgias.   Skin:  Negative for rash.   Neurological:  Negative for dizziness, weakness, light-headedness and headaches.   Psychiatric/Behavioral:  Negative for sleep disturbance. The patient is not nervous/anxious.        Physical Exam  Constitutional:       General: She is not in acute distress.     Appearance: Normal appearance. She is obese. She is not ill-appearing.   HENT:      Right Ear: Tympanic membrane and ear canal normal.      Left Ear: Tympanic membrane and ear canal normal.      Nose: No congestion.      Mouth/Throat:      Pharynx: No posterior oropharyngeal erythema.   Eyes:      Extraocular Movements: Extraocular movements intact.      Conjunctiva/sclera: Conjunctivae normal.   Neck:      Thyroid: No thyromegaly.   Cardiovascular:      Rate and Rhythm: Normal rate and  "regular rhythm.      Pulses: Normal pulses.      Heart sounds: Normal heart sounds. No murmur heard.     No friction rub. No gallop.   Pulmonary:      Effort: Pulmonary effort is normal. No respiratory distress.      Breath sounds: Normal breath sounds. No wheezing, rhonchi or rales.   Abdominal:      General: Bowel sounds are normal.      Palpations: Abdomen is soft.      Tenderness: There is no abdominal tenderness. There is no guarding or rebound.   Musculoskeletal:      Right lower leg: No edema.      Left lower leg: No edema.   Lymphadenopathy:      Cervical: No cervical adenopathy.   Skin:     General: Skin is warm and dry.      Capillary Refill: Capillary refill takes less than 2 seconds.   Neurological:      General: No focal deficit present.      Mental Status: She is alert and oriented to person, place, and time.   Psychiatric:         Mood and Affect: Mood normal.         Behavior: Behavior normal.       Vitals:    03/27/25 0909   BP: 116/72   BP Location: Right arm   Patient Position: Sitting   Pulse: 66   Temp: 97.6 °F (36.4 °C)   TempSrc: Oral   SpO2: 97%   Weight: 88.5 kg (195 lb 1.7 oz)   Height: 5' 4" (1.626 m)      Body mass index is 33.49 kg/m².        History     Past Medical History:   Diagnosis Date    ACL injury tear     Atrial fibrillation        Family History   Problem Relation Name Age of Onset    Lung cancer Mother  60        smoker    Heart attack Father  68    Heart disease Father      Breast cancer Maternal Grandmother  60    Lung cancer Paternal Grandmother          smoker       Allergies and Medications: (updated and reviewed)  Review of patient's allergies indicates:  No Known Allergies  Current Medications[1]    Patient Care Team:  Tim Moon MD as PCP - General (Internal Medicine)  Labadie, Eugenio C., MD (Obstetrics)  [facility] (Gastroenterology)         - The patient is given an After Visit Summary that lists all medications " with directions, allergies, education, orders placed during this encounter and follow-up instructions.      - I have reviewed the patient's medical information including past medical and family history sections including the medications and allergies.      - We discussed the patient's current medications.   This note was generated with the assistance of ambient listening technology. Verbal consent was obtained by the patient and accompanying visitor(s) for the recording of patient appointment to facilitate this note. I attest to having reviewed and edited the generated note for accuracy, though some syntax or spelling errors may persist. Please contact the author of this note for any clarification.          Duke Vargas NP                      [1]   Current Outpatient Medications   Medication Sig Dispense Refill    co-enzyme Q-10 50 mg capsule Take 50 mg by mouth once daily.      diclofenac sodium (VOLTAREN) 1 % Gel Lower extremities: Apply the gel (4 g) to the affected area 4 times daily. Do not apply more than 16 g daily to any one affected joint of the lower extremities. Upper extremities: Apply the gel (2 g) to the affected area 4 times daily. Do not apply more than 8 g daily to any one affected joint of the upper extremities. Total dose should not exceed 32 g per day, overall affected joints. 100 g 5    mecobalamin, vitamin B12, (B12 ACTIVE) 1,000 mcg Chew Take by mouth.      metoprolol succinate (TOPROL-XL) 50 MG 24 hr tablet Take 1 tablet (50 mg total) by mouth once daily. 90 tablet 3    multivitamin capsule Take 1 capsule by mouth once daily.      aspirin (ECOTRIN) 81 MG EC tablet Take 1 tablet (81 mg total) by mouth once daily.  0     No current facility-administered medications for this visit.

## 2025-03-28 ENCOUNTER — LAB VISIT (OUTPATIENT)
Dept: LAB | Facility: HOSPITAL | Age: 60
End: 2025-03-28
Payer: MEDICARE

## 2025-03-28 DIAGNOSIS — Z13.220 ENCOUNTER FOR LIPID SCREENING FOR CARDIOVASCULAR DISEASE: ICD-10-CM

## 2025-03-28 DIAGNOSIS — Z00.00 ROUTINE GENERAL MEDICAL EXAMINATION AT A HEALTH CARE FACILITY: ICD-10-CM

## 2025-03-28 DIAGNOSIS — Z86.39 HISTORY OF OBESITY: ICD-10-CM

## 2025-03-28 DIAGNOSIS — Z13.6 ENCOUNTER FOR LIPID SCREENING FOR CARDIOVASCULAR DISEASE: ICD-10-CM

## 2025-03-28 DIAGNOSIS — E66.9 OBESITY (BMI 30-39.9): ICD-10-CM

## 2025-03-28 LAB
ABSOLUTE EOSINOPHIL (OHS): 0.13 K/UL
ABSOLUTE MONOCYTE (OHS): 0.36 K/UL (ref 0.3–1)
ABSOLUTE NEUTROPHIL COUNT (OHS): 1.82 K/UL (ref 1.8–7.7)
ALBUMIN SERPL BCP-MCNC: 3.9 G/DL (ref 3.5–5.2)
ALP SERPL-CCNC: 84 UNIT/L (ref 40–150)
ALT SERPL W/O P-5'-P-CCNC: 26 UNIT/L (ref 10–44)
ANION GAP (OHS): 8 MMOL/L (ref 8–16)
AST SERPL-CCNC: 23 UNIT/L (ref 11–45)
BASOPHILS # BLD AUTO: 0.05 K/UL
BASOPHILS NFR BLD AUTO: 1.2 %
BILIRUB SERPL-MCNC: 0.5 MG/DL (ref 0.1–1)
BUN SERPL-MCNC: 18 MG/DL (ref 8–23)
CALCIUM SERPL-MCNC: 9.4 MG/DL (ref 8.7–10.5)
CHLORIDE SERPL-SCNC: 109 MMOL/L (ref 95–110)
CHOLEST SERPL-MCNC: 190 MG/DL (ref 120–199)
CHOLEST/HDLC SERPL: 2.9 {RATIO} (ref 2–5)
CO2 SERPL-SCNC: 26 MMOL/L (ref 23–29)
CREAT SERPL-MCNC: 0.8 MG/DL (ref 0.5–1.4)
EAG (OHS): 105 MG/DL (ref 68–131)
ERYTHROCYTE [DISTWIDTH] IN BLOOD BY AUTOMATED COUNT: 11.9 % (ref 11.5–14.5)
GFR SERPLBLD CREATININE-BSD FMLA CKD-EPI: >60 ML/MIN/1.73/M2
GLUCOSE SERPL-MCNC: 104 MG/DL (ref 70–110)
HBA1C MFR BLD: 5.3 % (ref 4–5.6)
HCT VFR BLD AUTO: 39.9 % (ref 37–48.5)
HDLC SERPL-MCNC: 65 MG/DL (ref 40–75)
HDLC SERPL: 34.2 % (ref 20–50)
HGB BLD-MCNC: 12.8 GM/DL (ref 12–16)
IMM GRANULOCYTES # BLD AUTO: 0.01 K/UL (ref 0–0.04)
IMM GRANULOCYTES NFR BLD AUTO: 0.2 % (ref 0–0.5)
LDLC SERPL CALC-MCNC: 107 MG/DL (ref 63–159)
LYMPHOCYTES # BLD AUTO: 1.81 K/UL (ref 1–4.8)
MCH RBC QN AUTO: 30.4 PG (ref 27–50)
MCHC RBC AUTO-ENTMCNC: 32.1 G/DL (ref 32–36)
MCV RBC AUTO: 95 FL (ref 82–98)
NONHDLC SERPL-MCNC: 125 MG/DL
NUCLEATED RBC (/100WBC) (OHS): 0 /100 WBC
PLATELET # BLD AUTO: 202 K/UL (ref 150–450)
PMV BLD AUTO: 11.1 FL (ref 9.2–12.9)
POTASSIUM SERPL-SCNC: 5.1 MMOL/L (ref 3.5–5.1)
PROT SERPL-MCNC: 7.5 GM/DL (ref 6–8.4)
RBC # BLD AUTO: 4.21 M/UL (ref 4–5.4)
RELATIVE EOSINOPHIL (OHS): 3.1 %
RELATIVE LYMPHOCYTE (OHS): 43.3 % (ref 18–48)
RELATIVE MONOCYTE (OHS): 8.6 % (ref 4–15)
RELATIVE NEUTROPHIL (OHS): 43.6 % (ref 38–73)
SODIUM SERPL-SCNC: 143 MMOL/L (ref 136–145)
T4 FREE SERPL-MCNC: NORMAL NG/DL
TRIGL SERPL-MCNC: 90 MG/DL (ref 30–150)
TSH SERPL-ACNC: 1.79 UIU/ML (ref 0.4–4)
WBC # BLD AUTO: 4.18 K/UL (ref 3.9–12.7)

## 2025-03-28 PROCEDURE — 36415 COLL VENOUS BLD VENIPUNCTURE: CPT | Mod: PN

## 2025-03-28 PROCEDURE — 83036 HEMOGLOBIN GLYCOSYLATED A1C: CPT

## 2025-03-28 PROCEDURE — 80053 COMPREHEN METABOLIC PANEL: CPT

## 2025-03-28 PROCEDURE — 80061 LIPID PANEL: CPT

## 2025-03-28 PROCEDURE — 82306 VITAMIN D 25 HYDROXY: CPT

## 2025-03-28 PROCEDURE — 85025 COMPLETE CBC W/AUTO DIFF WBC: CPT

## 2025-03-28 PROCEDURE — 84443 ASSAY THYROID STIM HORMONE: CPT

## 2025-03-29 LAB — 25(OH)D3+25(OH)D2 SERPL-MCNC: 23 NG/ML (ref 30–96)

## 2025-03-31 ENCOUNTER — RESULTS FOLLOW-UP (OUTPATIENT)
Dept: FAMILY MEDICINE | Facility: CLINIC | Age: 60
End: 2025-03-31

## 2025-07-14 NOTE — TELEPHONE ENCOUNTER
Patient's last office visit was 09/13/2024 and RTC:12 months. Patient is requesting a refill. Sent to .

## 2025-07-15 RX ORDER — METOPROLOL SUCCINATE 50 MG/1
50 TABLET, EXTENDED RELEASE ORAL
Qty: 90 TABLET | Refills: 3 | Status: SHIPPED | OUTPATIENT
Start: 2025-07-15

## (undated) DEVICE — PAD DEFIB CADENCE ADULT R2